# Patient Record
Sex: FEMALE | Race: WHITE
[De-identification: names, ages, dates, MRNs, and addresses within clinical notes are randomized per-mention and may not be internally consistent; named-entity substitution may affect disease eponyms.]

---

## 2017-06-02 ENCOUNTER — HOSPITAL ENCOUNTER (EMERGENCY)
Dept: HOSPITAL 17 - NEPK | Age: 22
Discharge: HOME | End: 2017-06-02
Payer: SELF-PAY

## 2017-06-02 VITALS
TEMPERATURE: 98.2 F | DIASTOLIC BLOOD PRESSURE: 77 MMHG | HEART RATE: 89 BPM | OXYGEN SATURATION: 98 % | SYSTOLIC BLOOD PRESSURE: 144 MMHG | RESPIRATION RATE: 15 BRPM

## 2017-06-02 DIAGNOSIS — K02.9: Primary | ICD-10-CM

## 2017-06-02 PROCEDURE — 99283 EMERGENCY DEPT VISIT LOW MDM: CPT

## 2017-06-02 NOTE — PD
HPI


Chief Complaint:  Oral / Dental Pain or Problem


Time Seen by Provider:  16:32


Travel History


International Travel<30 days:  No


Contact w/Intl Traveler<30days:  No


Traveled to known affect area:  No





History of Present Illness


HPI


22-year-old  female presents the emergency Department with dental pain 

and abscess to the #16 tooth.  Patient states she's been on penicillin for the 

last 2 days.  Patient states the swelling seems to improve but her pain 

continues.  Patient was given ibuprofen but this is not helping.  She's been 

using over-the-counter Orajel with some relief, however she complains of 

sensitivity to hot and cold and difficulty eating due to the pain.  She denies 

swelling or difficulty swallowing.  She denies fever or chills.  Patient does 

have a appointment with a dentist next Wednesday.  She has no known drug 

allergies.





PFSH


Past Medical History


:  3


Para:  1





Past Surgical History


 Section:  Yes


Other Surgery:  Yes ()





Social History


Alcohol Use:  No


Tobacco Use:  No


Substance Use:  No





Allergies-Medications


(Allergen,Severity, Reaction):  


Coded Allergies:  


     No Known Allergies (Unverified , 16)


Reported Meds & Prescriptions





Reported Meds & Active Scripts


Active


Reported


Select-Ob 29-0.6-0.4 mg (Prenatal Vit W/ Iron Polysacch) 1 Chw Chw  PO 








Review of Systems


Except as stated in HPI:  all other systems reviewed are Neg


General / Constitutional:  No: Fever


Eyes:  No: Visual changes


HENT:  Positive: Dental Difficulties, Earache,  No: Headaches, Vertigo, 

Lightheadedness, Sore Throat, Rhinitis, Rhinorrhea, Congestion, Nosebleed, Neck 

Stiffness, Neck Pain, Masses, Gingival Bleeding, Ear Discharge, Other


Cardiovascular:  No: Chest Pain or Discomfort


Respiratory:  No: Shortness of Breath


Gastrointestinal:  No: Abdominal Pain


Genitourinary:  No: Dysuria


Musculoskeletal:  No: Pain


Skin:  No Rash


Neurologic:  No: Weakness


Psychiatric:  No: Depression


Endocrine:  No: Polydipsia


Hematologic/Lymphatic:  No: Easy Bruising





Physical Exam


Narrative


GENERAL: Patient appears in mild to moderate distress.


SKIN: Warm and dry.  Normal color.  Normal turgor.


HEAD: Atraumatic. Normocephalic. 


EYES: Pupils equal and round. No scleral icterus. No injection or drainage. 


ENT: No nasal bleeding or discharge.  Mucous membranes pink and moist.  Patient'

s large caries to the #16 tooth with localized tenderness and swelling.  No 

significant dental abscess is noted.  TMs are clear bilaterally.  Pharynx is 

clear.  Airway is patent.


NECK: Trachea midline.  Supple and nontender without lymphadenopathy.


CARDIOVASCULAR: Regular rate and rhythm.  No murmurs gallops or rubs.


RESPIRATORY: No accessory muscle use. Clear to auscultation. Breath sounds 

equal bilaterally. 


MUSCULOSKELETAL: Extremities without clubbing, cyanosis, or edema. No obvious 

deformities. 


NEUROLOGICAL: Awake and alert. No obvious cranial nerve deficits.  Motor 

grossly within normal limits. Five out of 5 muscle strength in the arms and 

legs.  Normal speech.


PSYCHIATRIC: Appropriate mood and affect; insight and judgment normal.





Data


Data


Last Documented VS





Vital Signs








  Date Time  Temp Pulse Resp B/P Pulse Ox O2 Delivery O2 Flow Rate FiO2


 


17 15:52 98.2 89 15 144/77 98   








Orders





 Nyst-Diph-Lido Liq (Magic Mouthwash Adul (17 16:45)








[facility]


Medical Decision Making


Medical Screen Exam Complete:  Yes


Emergency Medical Condition:  Yes


Differential Diagnosis


Dental caries.  Dental abscess.  Dental pain.


Narrative Course


Patient is medically stable at time of exam.


Trial of Magic mouthwash by mouth, is given to the patient.


Patient feels improvement after the Magic mouthwash.


Patient will be continued Magic mouthwash 5-10 mL's every 2 hours when 

necessary dental pain.  120 mg with 1 refill.


Patient is given a prescription for ibuprofen 600 mg 4 times a day #40.


Patient also given a prescription for acetaminophen 500 mg 2 tabs every 6 hours 

when necessary #60.


Patient is to continue the penicillin as previously prescribed.


Patient should follow-up with her dentist as currently scheduled.


Patient can return to emergency Department with worsening symptoms if necessary.





Diagnosis





 Primary Impression:  


 Dental caries


 Additional Impression:  


 Pain due to dental caries


Referrals:  


Dentist


Patient Instructions:  Dental Abscess (ED), General Instructions





***Additional Instructions:


Trial of Magic mouthwash by mouth, is given to the patient.


Patient feels improvement after the Magic mouthwash.


Patient will be continued Magic mouthwash 5-10 mL's every 2 hours when 

necessary dental pain.  120 mg with 1 refill.


Patient is given a prescription for ibuprofen 600 mg 4 times a day #40.


Patient also given a prescription for acetaminophen 500 mg 2 tabs every 6 hours 

when necessary #60.


Patient is to continue the penicillin as previously prescribed.


Patient should follow-up with her dentist as currently scheduled.


Patient can return to emergency Department with worsening symptoms if necessary.


***Med/Other Pt SpecificInfo:  Prescription(s) given


Disposition:  01 DISCHARGE HOME


Condition:  Stable








Fabian Chambers 2017 16:37

## 2017-06-02 NOTE — PD
Physical Exam


Time Seen by Provider:  16:00


Narrative


21 y/o female here with dental pain for 2 weeks.  She says she chipped a tooth.

  Currently taking antibiotics, ibuprofen but pain persists.








Vital signs reviewed.  Seen at triage desk.  Awaiting bed placement.





Data


Data


Last Documented VS





Vital Signs








  Date Time  Temp Pulse Resp B/P Pulse Ox O2 Delivery O2 Flow Rate FiO2


 


6/2/17 15:52 98.2 89 15 144/77 98   











MDM


Medical Record Reviewed:  Yes


Supervised Visit with LINDA:  No








Deandre Kirby Jun 2, 2017 16:01

## 2017-06-09 ENCOUNTER — HOSPITAL ENCOUNTER (EMERGENCY)
Dept: HOSPITAL 17 - NEPK | Age: 22
Discharge: HOME | End: 2017-06-09
Payer: SELF-PAY

## 2017-06-09 VITALS
RESPIRATION RATE: 24 BRPM | OXYGEN SATURATION: 99 % | DIASTOLIC BLOOD PRESSURE: 95 MMHG | SYSTOLIC BLOOD PRESSURE: 146 MMHG | HEART RATE: 85 BPM | TEMPERATURE: 98.5 F

## 2017-06-09 VITALS — HEIGHT: 65 IN | BODY MASS INDEX: 18.37 KG/M2 | WEIGHT: 110.23 LBS

## 2017-06-09 DIAGNOSIS — Z72.0: ICD-10-CM

## 2017-06-09 DIAGNOSIS — K03.81: Primary | ICD-10-CM

## 2017-06-09 PROCEDURE — 99284 EMERGENCY DEPT VISIT MOD MDM: CPT

## 2017-06-09 PROCEDURE — 96372 THER/PROPH/DIAG INJ SC/IM: CPT

## 2017-06-09 NOTE — PD
Data


Data


Last Documented VS





Vital Signs








  Date Time  Temp Pulse Resp B/P Pulse Ox O2 Delivery O2 Flow Rate FiO2


 


6/9/17 13:56 98.5 85 24 146/95 99 Room Air  











MDM


Supervised Visit with LINDA:  No


Narrative Course


23 YO F with complaint of "wisdom tooth" pain. Intermittent for "weeks." 


Recently completed antibiotics, has seen a dentist.





Vitals reviewed. 


Awaiting bed placement.








Lolita Chaudhary Jun 9, 2017 14:00

## 2017-06-09 NOTE — PD
HPI


Chief Complaint:  Oral / Dental Pain or Problem


Time Seen by Provider:  14:21


Travel History


International Travel<30 days:  No


Contact w/Intl Traveler<30days:  No


Traveled to known affect area:  No





History of Present Illness


HPI


22-year-old female presents emergency Department with complaint of a crack to 

her left upper wisdom tooth that is causing her pain on and off for the past 

few weeks.  She saw a dentist and was put on antibiotics but she has completed 

and they will not see her again because she cannot afford to pay, and they will 

not set her up on a payment plan.  She was also seen here on  for the 

same complaint.  Denies fever, vomiting.  Pain is worse with hot and cold.  She 

has been using Magic mouthwash, Orajel, and taking ibuprofen with no relief of 

symptoms.  She has no other medical complaints.  No known allergies.  No other 

modifying factors or associated signs and symptoms.





PFSH


Past Medical History


Pregnant?:  Not Pregnant


:  3


Para:  1





Past Surgical History


 Section:  Yes


Other Surgery:  Yes ()





Social History


Alcohol Use:  No


Tobacco Use:  Yes


Substance Use:  No





Allergies-Medications


(Allergen,Severity, Reaction):  


Coded Allergies:  


     No Known Allergies (Unverified , 17)


Reported Meds & Prescriptions





Reported Meds & Active Scripts


Active


Magic Mouthwash Adult Liq (Multi-Ingredient Mouthwash/Gargle) 120 Ml Susp 5-10 

Ml SWISH-SPIT ACHS


     Each 5 mL contains: Nystatin 200,000 units, Diphenhydramine 4.25 mg,


     Viscous Lidocaine 10 mg, Cherry syrup 0.8 mL


Ibuprofen 600 Mg Tab 600 Mg PO Q6H PRN


Non-Aspirin Pain Relief ES (Acetaminophen) 500 Mg Tab 500 Mg PO Q6HR PRN


Reported


Select-Ob 29-0.6-0.4 mg (Prenatal Vit W/ Iron Polysacch) 1 Chw Chw  PO 








Review of Systems


Except as stated in HPI:  all other systems reviewed are Neg





Physical Exam


Narrative


GENERAL: Well-nourished, well-developed  female patient, in no acute 

distress; afebrile, nontoxic-appearing; tearful


SKIN: Warm and dry.


HEAD: Atraumatic. Normocephalic.  No facial edema, erythema, tenderness on 

palpation.  No lymphadenopathy.  


EYES: Pupils equal and round. No scleral icterus. No injection or drainage.


ENT: Mucosa pink and moist.  Airway patent. 


MOUTH: Mucous membranes moist, no lesions, tongue and gums appear normal.  

Tooth #16 with part of the tooth missing..  Surrounding gingiva is without 

erythema, edema, drainage.  No obvious abscess noted.  


NECK: Trachea midline.  No lymphadenopathy.  


CARDIOVASCULAR: Regular rate.  


RESPIRATORY: No accessory muscle use.


GASTROINTESTINAL: Flat.


MUSCULOSKELETAL: No obvious deformities. No clubbing.  No cyanosis.  No edema. 


NEUROLOGICAL: Awake and alert.  Oriented 3.  No obvious cranial nerve 

deficits.  Motor grossly within normal limits. Normal speech. 


PSYCHIATRIC: Appropriate mood and affect; insight and judgment normal.





Data


Data


Last Documented VS





Vital Signs








  Date Time  Temp Pulse Resp B/P Pulse Ox O2 Delivery O2 Flow Rate FiO2


 


17 13:56 98.5 85 24 146/95 99 Room Air  








Orders





 Ketorolac Inj (Toradol Inj) (17 14:30)








MDM


Medical Decision Making


Medical Screen Exam Complete:  Yes


Emergency Medical Condition:  Yes


Medical Record Reviewed:  Yes


Differential Diagnosis


Dentalgia, cracked tooth, dental trauma, infected dental caries


Narrative Course


22-year-old female with left upper wisdom tooth that is cracked and causing 

tooth pain.  Patient was previously treated with penicillin and has not 

followed up with a dentist due to finances.  She is afebrile and nontoxic-

appearing.  She denies fever, vomiting.  Emergency information dental sheet 

provided.  Instructed patient to follow up with dentist.  Toradol administered 

in the ER.  Patient verbalizes understanding and agreement with treatment plan.

  Patient is medically cleared and stable for discharge.  Discussed reasons to 

return to the emergency department.  Instructed patient to follow up with 

primary care provider.  Patient agrees with treatment plan.  The patients vital 

signs are stable and the patient is stable for outpatient follow-up and 

treatment.  Patient discharged home, stable and in no acute distress.





Diagnosis





 Primary Impression:  


 Cracked tooth


 Additional Impression:  


 Dentalgia


Referrals:  


[facility]





Dentist





Primary Care Physician


Patient Instructions:  General Instructions, Toothache (ED)


Departure Forms:  Tests/Procedures





***Additional Instructions:


Complete full course of antibiotics


Ibuprofen or Tylenol as directed and as needed to reduce pain and inflammation


Use Magic mouthwash rinse as directed and as needed to decrease pain


Warm or cool compresses to the affected area


Follow-up with dentist


Follow-up with primary care provider


Return to emergency department immediately with worsening of symptoms


***Med/Other Pt SpecificInfo:  No Change to Meds, No Meds Exist/No RX given


Disposition:  01 DISCHARGE HOME


Condition:  Stable








Christie Sherwood 2017 14:22

## 2017-06-11 ENCOUNTER — HOSPITAL ENCOUNTER (EMERGENCY)
Dept: HOSPITAL 17 - NEPA | Age: 22
Discharge: HOME | End: 2017-06-11
Payer: SELF-PAY

## 2017-06-11 VITALS
SYSTOLIC BLOOD PRESSURE: 138 MMHG | RESPIRATION RATE: 15 BRPM | TEMPERATURE: 98.2 F | HEART RATE: 77 BPM | OXYGEN SATURATION: 98 % | DIASTOLIC BLOOD PRESSURE: 80 MMHG

## 2017-06-11 VITALS — HEIGHT: 65 IN | WEIGHT: 110.23 LBS | BODY MASS INDEX: 18.37 KG/M2

## 2017-06-11 DIAGNOSIS — K08.89: Primary | ICD-10-CM

## 2017-06-11 DIAGNOSIS — Z72.0: ICD-10-CM

## 2017-06-11 PROCEDURE — 64400 NJX AA&/STRD TRIGEMINAL NRV: CPT

## 2017-06-11 NOTE — PD
HPI


Chief Complaint:  Oral / Dental Pain or Problem


Time Seen by Provider:  10:35


Travel History


International Travel<30 days:  No


Contact w/Intl Traveler<30days:  No


Traveled to known affect area:  No





History of Present Illness


HPI


22-year-old female presents for evaluation of dental pain.  Symptoms started 2 

weeks ago.  The pain is a throbbing pain, constant, localized to the left 

maxillary third molar, pain is worse with chewing, unrelieved with over-the-

counter Tylenol and Motrin.  This is her third visit here for evaluation of 

this dental pain.  She reports that she has an appointment with a dentist in 2 

days in order to have the tooth extracted.  She has no other complaints at this 

time.





PFSH


Past Medical History


Pregnant?:  Not Pregnant


:  3


Para:  1





Past Surgical History


 Section:  Yes


Other Surgery:  Yes ()





Social History


Alcohol Use:  No


Tobacco Use:  Yes


Substance Use:  No





Allergies-Medications


(Allergen,Severity, Reaction):  


Coded Allergies:  


     No Known Allergies (Unverified , 17)


Reported Meds & Prescriptions





Reported Meds & Active Scripts


Active


Magic Mouthwash Adult Liq (Multi-Ingredient Mouthwash/Gargle) 120 Ml Susp 10 Ml 

SWISH-SWAL ACHS


     Each 5mL contains: Nystatin 200,000units,


     Diphenhydramine 4.25mg, Viscous Lidocaine 10mg,


     Cherry syrup 0.8 mL


Diclofenac Sodium DR (Diclofenac Sodium) 75 Mg Tabdr 75 Mg PO BID 7 Days








Review of Systems


General / Constitutional:  No: Fever, Chills


HENT:  Positive: Dental Difficulties





Physical Exam


Narrative


GENERAL: Well-developed well-nourished female in no acute distress


SKIN: Warm and dry.


HEAD: Atraumatic. Normocephalic. 


EYES: Pupils equal and round. No scleral icterus. No injection or drainage. 


ENT: No nasal bleeding or discharge.  Mucous membranes pink and moist.  Left 

maxillary third molar somewhat decayed, tender to palpation.  There is no 

gingival edema, facial pain, trismus.


NECK: Trachea midline. No JVD.  No lymphadenopathy or submandibular edema.





Data


Data


Last Documented VS





Vital Signs








  Date Time  Temp Pulse Resp B/P Pulse Ox O2 Delivery O2 Flow Rate FiO2


 


17 09:59 98.2 77 15 138/80 98   








Orders





 Lidocaine Pf 1% Inj (Xylocaine-Mpf 1% In (17 10:45)


Bupivacaine Pf 0.5% Inj (Marcaine Pf 0.5 (17 10:45)








MDM


Medical Decision Making


Medical Screen Exam Complete:  Yes


Emergency Medical Condition:  Yes


Medical Record Reviewed:  Yes


Differential Diagnosis


Dental caries, pulpitis, pericoronitis, periodontal abscess, dental fracture


Narrative Course


22-year-old female presents with dental pain localized to the left maxillary 

third molar.  After verbal consent was obtained a posterior superior alveolar 

nerve block was performed with complete resolution of her pain.  One percent 

lidocaine, 0.5% Marcaine was utilized.  Discharged with nonnarcotic pain 

medication.





Procedures


**Procedure Narrative**


Posterior superior alveolar nerve block: Performed using total of 3 mL of 1% 

lidocaine, 0.5% Marcaine.  Patient tolerated procedure well.





Diagnosis





 Primary Impression:  


 Dentalgia





***Additional Instructions:


Follow-up with dentist as scheduled for definitive therapy.  Medication as 

needed.


***Med/Other Pt SpecificInfo:  Prescription(s) given


Scripts


Nystatin-Diphenhydramine-Lidocaine Liq (Magic Mouthwash Adult Liq)120 Ml Susp10 

Ml SWISH-SWAL ACHS  #120 ML  Ref 1


   Each 5mL contains: Nystatin 200,000units,


   Diphenhydramine 4.25mg, Viscous Lidocaine 10mg,


   Cherry syrup 0.8 mL


   Prov:Donna Be MD         17 


Diclofenac Sodium DR 75 Mg Tabdr75 Mg PO BID  7 Days  Ref 0


   Prov:Donna Be MD         17


Disposition:  01 DISCHARGE HOME


Condition:  Stable








Deandre Kirby 2017 10:52

## 2017-12-19 ENCOUNTER — HOSPITAL ENCOUNTER (EMERGENCY)
Dept: HOSPITAL 17 - NEPK | Age: 22
Discharge: HOME | End: 2017-12-19
Payer: SELF-PAY

## 2017-12-19 VITALS
BODY MASS INDEX: 20.2 KG/M2 | HEIGHT: 65 IN | SYSTOLIC BLOOD PRESSURE: 114 MMHG | WEIGHT: 121.25 LBS | DIASTOLIC BLOOD PRESSURE: 61 MMHG | OXYGEN SATURATION: 98 % | TEMPERATURE: 98.6 F | HEART RATE: 87 BPM | RESPIRATION RATE: 16 BRPM

## 2017-12-19 DIAGNOSIS — J02.9: Primary | ICD-10-CM

## 2017-12-19 DIAGNOSIS — Z72.0: ICD-10-CM

## 2017-12-19 PROCEDURE — 99283 EMERGENCY DEPT VISIT LOW MDM: CPT

## 2017-12-19 NOTE — PD
HPI


Chief Complaint:  ENT Complaint


Time Seen by Provider:  10:47


Travel History


International Travel<30 days:  No


Contact w/Intl Traveler<30days:  No


Traveled to known affect area:  No





History of Present Illness


HPI


This is a 22-year-old female here with sore throat and fever times one day.  

She reports her son has strep pharyngitis.  Symptom severity is mild.  No 

aggravating or alleviating factors.





PFSH


Past Medical History


Medical History:  Denies Significant Hx


Pregnant?:  Not Pregnant


LMP:  2017


:  3


Para:  1





Past Surgical History


 Section:  Yes


Other Surgery:  Yes ()





Social History


Alcohol Use:  No


Tobacco Use:  Yes


Substance Use:  No





Allergies-Medications


(Allergen,Severity, Reaction):  


Coded Allergies:  


     No Known Allergies (Unverified  Adverse Reaction, Unknown, 17)


Reported Meds & Prescriptions





Reported Meds & Active Scripts


Active


No Active Prescriptions or Reported Medications    








Review of Systems


General / Constitutional:  Positive: Fever


HENT:  Positive: Sore Throat





Physical Exam


Narrative


GENERAL: Alert female well-appearing.


SKIN: Warm and dry.


HEAD: Normocephalic.


EYES: No scleral icterus. No injection or drainage. 


THROAT: Pharyngeal erythema with mild tonsillar hypertrophy small amount x-ray.


NECK: Supple, trachea midline. No JVD or lymphadenopathy.  No meningismus.





Data


Data


Last Documented VS





Vital Signs








  Date Time  Temp Pulse Resp B/P (MAP) Pulse Ox O2 Delivery O2 Flow Rate FiO2


 


17 10:36 98.6 87 16 114/61 (78) 98 Room Air  











MDM


Medical Decision Making


Medical Screen Exam Complete:  Yes


Emergency Medical Condition:  Yes


Differential Diagnosis


Strep pharyngitis, viral pharyngitis, URI


Narrative Course


22-year-old female here with sore throat.  Exposure to strep pharyngitis.  

Patient has mild tonsillar hypertrophy with exudate.  She'll be treated with Pen

-Vee K





Diagnosis





 Primary Impression:  


 Pharyngitis


 Qualified Codes:  J02.9 - Acute pharyngitis, unspecified


Referrals:  


[facility]


Departure Forms:  Tests/Procedures, Work Release   Enter return to work date:  

Dec 20, 2017


Scripts


Penicillin V Potassium (Penicillin V Potassium) 500 Mg Tab


500 MG PO BID for Infection for 10 Days, #20 TAB 0 Refills


   Prov: Dixie Be         17


Disposition:  01 DISCHARGE HOME


Condition:  Stable











Dixie Be Dec 19, 2017 11:15

## 2018-01-12 ENCOUNTER — HOSPITAL ENCOUNTER (EMERGENCY)
Dept: HOSPITAL 17 - NED | Age: 23
Discharge: LEFT BEFORE DISCHARGE | End: 2018-01-12
Payer: SELF-PAY

## 2018-01-12 VITALS — BODY MASS INDEX: 20.2 KG/M2 | WEIGHT: 121.25 LBS | HEIGHT: 65 IN

## 2018-01-12 VITALS
HEART RATE: 122 BPM | OXYGEN SATURATION: 97 % | SYSTOLIC BLOOD PRESSURE: 106 MMHG | DIASTOLIC BLOOD PRESSURE: 56 MMHG | RESPIRATION RATE: 12 BRPM | TEMPERATURE: 100.7 F

## 2018-01-12 DIAGNOSIS — J00: Primary | ICD-10-CM

## 2018-01-12 PROCEDURE — 99281 EMR DPT VST MAYX REQ PHY/QHP: CPT

## 2018-06-10 ENCOUNTER — HOSPITAL ENCOUNTER (EMERGENCY)
Dept: HOSPITAL 17 - PHED | Age: 23
Discharge: HOME | End: 2018-06-10
Payer: SELF-PAY

## 2018-06-10 VITALS
RESPIRATION RATE: 16 BRPM | OXYGEN SATURATION: 96 % | SYSTOLIC BLOOD PRESSURE: 104 MMHG | HEART RATE: 81 BPM | TEMPERATURE: 98.3 F | DIASTOLIC BLOOD PRESSURE: 67 MMHG

## 2018-06-10 VITALS — RESPIRATION RATE: 15 BRPM | OXYGEN SATURATION: 99 %

## 2018-06-10 VITALS — DIASTOLIC BLOOD PRESSURE: 63 MMHG | SYSTOLIC BLOOD PRESSURE: 102 MMHG

## 2018-06-10 VITALS — WEIGHT: 112.22 LBS | HEIGHT: 65 IN | BODY MASS INDEX: 18.7 KG/M2

## 2018-06-10 VITALS — RESPIRATION RATE: 16 BRPM

## 2018-06-10 DIAGNOSIS — Z72.0: ICD-10-CM

## 2018-06-10 DIAGNOSIS — K29.70: Primary | ICD-10-CM

## 2018-06-10 LAB
ALBUMIN SERPL-MCNC: 4.3 GM/DL (ref 3.4–5)
ALP SERPL-CCNC: 78 U/L (ref 45–117)
ALT SERPL-CCNC: 23 U/L (ref 10–53)
AST SERPL-CCNC: 20 U/L (ref 15–37)
BASOPHILS # BLD AUTO: 0.5 TH/MM3 (ref 0–0.2)
BASOPHILS NFR BLD: 3.5 % (ref 0–2)
BILIRUB SERPL-MCNC: 0.5 MG/DL (ref 0.2–1)
BUN SERPL-MCNC: 12 MG/DL (ref 7–18)
CALCIUM SERPL-MCNC: 9.2 MG/DL (ref 8.5–10.1)
CHLORIDE SERPL-SCNC: 106 MEQ/L (ref 98–107)
CREAT SERPL-MCNC: 0.74 MG/DL (ref 0.5–1)
EOSINOPHIL # BLD: 0.1 TH/MM3 (ref 0–0.4)
EOSINOPHIL NFR BLD: 0.6 % (ref 0–4)
ERYTHROCYTE [DISTWIDTH] IN BLOOD BY AUTOMATED COUNT: 12.3 % (ref 11.6–17.2)
GFR SERPLBLD BASED ON 1.73 SQ M-ARVRAT: 96 ML/MIN (ref 89–?)
GLUCOSE SERPL-MCNC: 63 MG/DL (ref 74–106)
HCO3 BLD-SCNC: 24.3 MEQ/L (ref 21–32)
HCT VFR BLD CALC: 43.7 % (ref 35–46)
HGB BLD-MCNC: 14.3 GM/DL (ref 11.6–15.3)
LYMPHOCYTES # BLD AUTO: 1.7 TH/MM3 (ref 1–4.8)
LYMPHOCYTES NFR BLD AUTO: 11.5 % (ref 9–44)
MCH RBC QN AUTO: 27.4 PG (ref 27–34)
MCHC RBC AUTO-ENTMCNC: 32.6 % (ref 32–36)
MCV RBC AUTO: 84 FL (ref 80–100)
MONOCYTE #: 0.4 TH/MM3 (ref 0–0.9)
MONOCYTES NFR BLD: 3.1 % (ref 0–8)
NEUTROPHILS # BLD AUTO: 11.7 TH/MM3 (ref 1.8–7.7)
NEUTROPHILS NFR BLD AUTO: 81.3 % (ref 16–70)
PLATELET # BLD: 284 TH/MM3 (ref 150–450)
PMV BLD AUTO: 8.2 FL (ref 7–11)
PROT SERPL-MCNC: 8.3 GM/DL (ref 6.4–8.2)
RBC # BLD AUTO: 5.2 MIL/MM3 (ref 4–5.3)
SODIUM SERPL-SCNC: 139 MEQ/L (ref 136–145)
WBC # BLD AUTO: 14.4 TH/MM3 (ref 4–11)

## 2018-06-10 PROCEDURE — 80053 COMPREHEN METABOLIC PANEL: CPT

## 2018-06-10 PROCEDURE — 84703 CHORIONIC GONADOTROPIN ASSAY: CPT

## 2018-06-10 PROCEDURE — 74177 CT ABD & PELVIS W/CONTRAST: CPT

## 2018-06-10 PROCEDURE — 83690 ASSAY OF LIPASE: CPT

## 2018-06-10 PROCEDURE — 85025 COMPLETE CBC W/AUTO DIFF WBC: CPT

## 2018-06-10 PROCEDURE — 96374 THER/PROPH/DIAG INJ IV PUSH: CPT

## 2018-06-10 PROCEDURE — 96361 HYDRATE IV INFUSION ADD-ON: CPT

## 2018-06-10 PROCEDURE — 99284 EMERGENCY DEPT VISIT MOD MDM: CPT

## 2018-06-10 NOTE — RADRPT
EXAM DATE:  6/10/2018 8:05 PM EDT

AGE/SEX:        23 years / Female



INDICATIONS:  Epigastric pain, nausea and vomiting. 



CLINICAL DATA:  This is the patient's initial encounter. Patient reports that signs and symptoms have
 been present for 1 day and indicates a pain score of 7/10. 

                                                                          

MEDICAL/SURGICAL HISTORY:       None.  section.



ORAL CONTRAST:   No oral contrast ingested.



RADIATION DOSE:  4.61 CTDI (mGy)









COMPARISON:      No prior exams available for comparison. 





TECHNIQUE:  Multiple contiguous axial images were obtained through the abdomen and pelvis following b
olus infusion of  85 ml Omnipaque 350 (iohexol)  nonionic water-soluble contrast as a single exam dos
e. No oral contrast ingested.  Using automated exposure control and adjustment of the mA and/or kV ac
cording to patient size, the radiation dose was kept as low as reasonably achievable to obtain optima
l diagnostic quality images.



FINDINGS: 

Lung bases are clear. No acute findings in the liver, spleen, adrenals, kidneys or pancreas. No calci
fied gallstones or biliary ductal dilatation.



No free fluid or free air. No bowel obstruction. No adenopathy.



CONCLUSION:

1.  No acute findings on abdomen and pelvic CT.



Electronically signed by: Jarad Gilbert MD  6/10/2018 8:20 PM EDT

## 2018-06-10 NOTE — PD
Physical Exam


Date Seen by Provider:  Yoshi 10, 2018


Time Seen by Provider:  19:15


Narrative


Accepted in transfer of care from Dr. Stanlye


@ 8:45


GENERAL: Well-developed well-nourished female no acute distress no respiratory 

distress


SKIN: Warm and dry.


GASTROINTESTINAL: Abdomen soft, non-tender, nondistended. 











Data


Data


Last Documented VS





Vital Signs








  Date Time  Temp Pulse Resp B/P (MAP) Pulse Ox O2 Delivery O2 Flow Rate FiO2


 


6/10/18 20:15   16     


 


6/10/18 19:00     99 Room Air  


 


6/10/18 17:43 98.3 81  104/67 (79)    








Orders





 Orders


Complete Blood Count With Diff (6/10/18 18:40)


Comprehensive Metabolic Panel (6/10/18 18:40)


Lipase (6/10/18 18:40)


Ct Abd/Pel W Iv Contrast(Rout) (6/10/18 18:40)


Iv Access Insert/Monitor (6/10/18 18:40)


Ecg Monitoring (6/10/18 18:40)


Oximetry (6/10/18 18:40)


Sodium Chlor 0.9% 1000 Ml Inj (Ns 1000 M (6/10/18 18:40)


Sodium Chloride 0.9% Flush (Ns Flush) (6/10/18 18:45)


Ed Urine Pregnancytest Poc (6/10/18 18:40)


Ondansetron  Odt (Zofran  Odt) (6/10/18 18:45)


Morphine Inj (Morphine Inj) (6/10/18 18:45)


Iohexol 350 Inj (Omnipaque 350 Inj) (6/10/18 20:00)


Ed Discharge Order (6/10/18 20:46)





Labs





Laboratory Tests








Test


  6/10/18


19:00


 


White Blood Count 14.4 TH/MM3 


 


Red Blood Count 5.20 MIL/MM3 


 


Hemoglobin 14.3 GM/DL 


 


Hematocrit 43.7 % 


 


Mean Corpuscular Volume 84.0 FL 


 


Mean Corpuscular Hemoglobin 27.4 PG 


 


Mean Corpuscular Hemoglobin


Concent 32.6 % 


 


 


Red Cell Distribution Width 12.3 % 


 


Platelet Count 284 TH/MM3 


 


Mean Platelet Volume 8.2 FL 


 


Neutrophils (%) (Auto) 81.3 % 


 


Lymphocytes (%) (Auto) 11.5 % 


 


Monocytes (%) (Auto) 3.1 % 


 


Eosinophils (%) (Auto) 0.6 % 


 


Basophils (%) (Auto) 3.5 % 


 


Neutrophils # (Auto) 11.7 TH/MM3 


 


Lymphocytes # (Auto) 1.7 TH/MM3 


 


Monocytes # (Auto) 0.4 TH/MM3 


 


Eosinophils # (Auto) 0.1 TH/MM3 


 


Basophils # (Auto) 0.5 TH/MM3 


 


CBC Comment DIFF FINAL 


 


Differential Comment  


 


Blood Urea Nitrogen 12 MG/DL 


 


Creatinine 0.74 MG/DL 


 


Random Glucose 63 MG/DL 


 


Total Protein 8.3 GM/DL 


 


Albumin 4.3 GM/DL 


 


Calcium Level 9.2 MG/DL 


 


Alkaline Phosphatase 78 U/L 


 


Aspartate Amino Transf


(AST/SGOT) 20 U/L 


 


 


Alanine Aminotransferase


(ALT/SGPT) 23 U/L 


 


 


Total Bilirubin 0.5 MG/DL 


 


Sodium Level 139 MEQ/L 


 


Potassium Level 3.9 MEQ/L 


 


Chloride Level 106 MEQ/L 


 


Carbon Dioxide Level 24.3 MEQ/L 


 


Anion Gap 9 MEQ/L 


 


Estimat Glomerular Filtration


Rate 96 ML/MIN 


 


 


Lipase 101 U/L 











MDM


Medical Record Reviewed:  Yes


Supervised Visit with LINDA:  No


Interpretation(s)


CBC & BMP Diagram


6/10/18 19:00








Total Protein 8.3 H, Albumin 4.3, Calcium Level 9.2, Alkaline Phosphatase 78, 

Aspartate Amino Transf (AST/SGOT) 20, Alanine Aminotransferase (ALT/SGPT) 23, 

Total Bilirubin 0.5








Vital Signs








  Date Time  Temp Pulse Resp B/P (MAP) Pulse Ox O2 Delivery O2 Flow Rate FiO2


 


6/10/18 19:00   15  99 Room Air  


 


6/10/18 17:43 98.3 81 16 104/67 (79) 96   





POC hcg: negative





Last Impressions








Abdomen/Pelvis CT 6/10/18 1840 Signed





Impressions: 





 CONCLUSION:





 1.  No acute findings on abdomen and pelvic CT.





  





 








Differential Diagnosis


Accepted in transfer of care from Dr. Stanley; please refer to her dictation


Narrative Course


Accepted in transfer of care from Dr. Stanley; follow up pending studies and 

disposition


At 8:40 PM CT abdomen pelvis is resulted and reveals no acute abnormality lab 

values remarkable for nonspecific white count of 14,000 most likely reflective 

of patient's hydration status gastritis and stress demargination


Patient informed of imaging results lab results in a stable for outpatient 

management with encouragement to follow-up with her managing/primary care 

provider and encouraged to use over-the-counter Zantac 150 twice daily for 7-14 

days and to avoid alcohol consumption.


Diagnosis





 Primary Impression:  


 Gastritis


Referrals:  


Primary Care Physician


call for appointment


Patient Instructions:  General Instructions





***Additional Instruction:  


Increase fluid hydration


Avoid use of nonsteroidal anti-inflammatory medication such as ibuprofen/Advil/

Motrin/Naprosyn/naproxen/Aleve


Do not drink alcoholic beverages


Use over-the-counter Zantac 150 twice daily for 7-14 days


Return to the emergency department for any concerns or change in condition


Follow-up with your primary care provider


Scripts


No Active Prescriptions or Reported Meds


Disposition:  01 DISCHARGE HOME


Condition:  Stable











Debbie Thomson MD Yoshi 10, 2018 19:17

## 2018-06-10 NOTE — PD
HPI


Chief Complaint:  GI Complaint


Time Seen by Provider:  18:30


Travel History


International Travel<30 days:  No


Contact w/Intl Traveler<30days:  No


Traveled to known affect area:  No





History of Present Illness


HPI


Patient presents for abdominal pain secondary to possible "alcohol poisoning."  

She states that she had 5 shots and 5 drinks on yesterday and has had abdominal 

pain nausea vomiting since.  Pain is described as being epigastric, 7 out of 10

, constant, nonradiating, worse with eating, no alleviating factors.  Is also 

reporting vomiting clear/yellow fluid.  Ports 8 out of 10 headache, but denies 

fall or hitting her head or LOC.  Unsure fever or chills but states that she 

has had cold sweats and denies back pain.





PFSH


Past Medical History


Pregnant?:  Not Pregnant


LMP:  2018


:  3


Para:  1





Past Surgical History


 Section:  Yes


Other Surgery:  Yes ()





Social History


Alcohol Use:  No


Tobacco Use:  Yes


Substance Use:  No





Allergies-Medications


(Allergen,Severity, Reaction):  


Coded Allergies:  


     No Known Allergies (Unverified  Adverse Reaction, Unknown, 6/10/18)


Reported Meds & Prescriptions





Reported Meds & Active Scripts


Active


Penicillin V Potassium 500 Mg Tab 500 Mg PO BID 10 Days








Review of Systems


Except as stated in HPI:  all other systems reviewed are Neg





Physical Exam


Narrative


GENERAL: No acute distress.


SKIN: Focused skin assessment warm/dry.


HEAD: Atraumatic. Normocephalic. 


EYES: Pupils equal and round. No scleral icterus. No injection or drainage. 


ENT: No nasal bleeding or discharge.  Mucous membranes dry.


NECK: Trachea midline. No JVD. 


CARDIOVASCULAR: Regular rate and rhythm.  No murmur appreciated.


RESPIRATORY: No accessory muscle use. Clear to auscultation. Breath sounds 

equal bilaterally. 


GASTROINTESTINAL: Abdomen soft, epigastric tenderness, nondistended. Hepatic 

and splenic margins not palpable. 


MUSCULOSKELETAL: No obvious deformities. No clubbing.  No cyanosis.  No edema. 


NEUROLOGICAL: Awake and alert. No obvious cranial nerve deficits.  Motor 

grossly within normal limits. Normal speech.


PSYCHIATRIC: Appropriate mood and affect; insight and judgment normal.





Data


Data


Last Documented VS





Vital Signs








  Date Time  Temp Pulse Resp B/P (MAP) Pulse Ox O2 Delivery O2 Flow Rate FiO2


 


6/10/18 17:43 98.3 81 16 104/67 (79) 96   








Orders





 Orders


Complete Blood Count With Diff (6/10/18 18:40)


Comprehensive Metabolic Panel (6/10/18 18:40)


Lipase (6/10/18 18:40)


Ct Abd/Pel W Iv Contrast(Rout) (6/10/18 18:40)


Iv Access Insert/Monitor (6/10/18 18:40)


Ecg Monitoring (6/10/18 18:40)


Oximetry (6/10/18 18:40)


Sodium Chlor 0.9% 1000 Ml Inj (Ns 1000 M (6/10/18 18:40)


Sodium Chloride 0.9% Flush (Ns Flush) (6/10/18 18:45)


Ed Urine Pregnancytest Poc (6/10/18 18:40)


Ondansetron  Odt (Zofran  Odt) (6/10/18 18:45)


Morphine Inj (Morphine Inj) (6/10/18 18:45)








MDM


Medical Decision Making


Medical Screen Exam Complete:  Yes


Emergency Medical Condition:  Yes


Differential Diagnosis


Gastritis, peptic ulcer disease, pancreatitis, cholecystitis, pregnancy


Narrative Course


She presents to the emergency department complaining of abdominal pain and 

nausea vomiting after alcohol ingestion yesterday.  Patient placed on the 

cardiac monitor, IV access obtained, and CT/labs/IV fluids/Zofran 4 mg ODT/2 mg 

IV morphine ordered.  Patient will be sent on to Dr. Thomson for lab and CT 

results and final disposition.











Mary Jane Stanley MD Yoshi 10, 2018 18:42